# Patient Record
Sex: FEMALE | Race: WHITE | Employment: PART TIME | URBAN - NONMETROPOLITAN AREA
[De-identification: names, ages, dates, MRNs, and addresses within clinical notes are randomized per-mention and may not be internally consistent; named-entity substitution may affect disease eponyms.]

---

## 2022-06-17 ENCOUNTER — OFFICE VISIT (OUTPATIENT)
Dept: FAMILY MEDICINE CLINIC | Age: 67
End: 2022-06-17
Payer: COMMERCIAL

## 2022-06-17 ENCOUNTER — TELEPHONE (OUTPATIENT)
Dept: ADMINISTRATIVE | Age: 67
End: 2022-06-17

## 2022-06-17 VITALS
HEIGHT: 62 IN | SYSTOLIC BLOOD PRESSURE: 124 MMHG | BODY MASS INDEX: 25.58 KG/M2 | RESPIRATION RATE: 18 BRPM | TEMPERATURE: 98.1 F | HEART RATE: 93 BPM | OXYGEN SATURATION: 97 % | WEIGHT: 139 LBS | DIASTOLIC BLOOD PRESSURE: 76 MMHG

## 2022-06-17 DIAGNOSIS — H61.22 EXCESSIVE CERUMEN IN LEFT EAR CANAL: ICD-10-CM

## 2022-06-17 DIAGNOSIS — H92.02 LEFT EAR PAIN: Primary | ICD-10-CM

## 2022-06-17 PROCEDURE — 99213 OFFICE O/P EST LOW 20 MIN: CPT | Performed by: NURSE PRACTITIONER

## 2022-06-17 PROCEDURE — 1123F ACP DISCUSS/DSCN MKR DOCD: CPT | Performed by: NURSE PRACTITIONER

## 2022-06-17 PROCEDURE — 69210 REMOVE IMPACTED EAR WAX UNI: CPT | Performed by: NURSE PRACTITIONER

## 2022-06-17 RX ORDER — CYCLOBENZAPRINE HCL 10 MG
TABLET ORAL
COMMUNITY
Start: 2022-06-08

## 2022-06-17 RX ORDER — TRAZODONE HYDROCHLORIDE 100 MG/1
TABLET ORAL
COMMUNITY
Start: 2022-06-05

## 2022-06-17 RX ORDER — AMOXICILLIN 875 MG/1
875 TABLET, COATED ORAL 2 TIMES DAILY
Qty: 20 TABLET | Refills: 0 | Status: SHIPPED | OUTPATIENT
Start: 2022-06-17 | End: 2022-06-27

## 2022-06-17 RX ORDER — OFLOXACIN 3 MG/ML
SOLUTION AURICULAR (OTIC)
COMMUNITY
Start: 2022-06-13 | End: 2022-06-17 | Stop reason: ALTCHOICE

## 2022-06-17 RX ORDER — LORATADINE 10 MG/1
TABLET ORAL
COMMUNITY
Start: 2022-04-07

## 2022-06-17 RX ORDER — ESCITALOPRAM OXALATE 20 MG/1
TABLET ORAL
COMMUNITY
Start: 2022-05-27

## 2022-06-17 RX ORDER — IBUPROFEN 800 MG/1
TABLET ORAL
COMMUNITY
Start: 2022-06-08

## 2022-06-17 RX ORDER — PREDNISONE 10 MG/1
TABLET ORAL
COMMUNITY
Start: 2022-06-08

## 2022-06-17 RX ORDER — NEOMYCIN SULFATE, POLYMYXIN B SULFATE AND HYDROCORTISONE 10; 3.5; 1 MG/ML; MG/ML; [USP'U]/ML
3 SUSPENSION/ DROPS AURICULAR (OTIC) 4 TIMES DAILY
Qty: 1 EACH | Refills: 0 | Status: SHIPPED | OUTPATIENT
Start: 2022-06-17 | End: 2022-06-27

## 2022-06-17 NOTE — PROGRESS NOTES
tablet, TAKE ONE TABLET BY MOUTH TWO TIMES A DAY, Disp: , Rfl:     predniSONE (DELTASONE) 10 MG tablet, TAKE 6 TABLETS BY MOUTH DAILY ON DAY 1  THEN 5 TABS ON DAY 2  THEN 4 TABS ON DAY 3  THEN 3 TABS ON DAY 4  THEN 2 TABS ON DAY 5 AND 1 TABLET, Disp: , Rfl:     traZODone (DESYREL) 100 MG tablet, TAKE ONE TABLET BY MOUTH EVERY DAY AT BEDTIME, Disp: , Rfl:     amoxicillin (AMOXIL) 875 MG tablet, Take 1 tablet by mouth 2 times daily for 10 days, Disp: 20 tablet, Rfl: 0    neomycin-polymyxin-hydrocortisone (CORTISPORIN) 3.5-65705-3 otic suspension, Place 3 drops into the left ear 4 times daily for 10 days, Disp: 1 each, Rfl: 0    Allergies:   No Known Allergies    Social History:     Social History     Tobacco Use    Smoking status: Never Smoker    Smokeless tobacco: Never Used   Substance Use Topics    Alcohol use: Not on file    Drug use: Not on file       Patient lives at home. Physical Exam:     Vitals:    06/17/22 1149   BP: 124/76   Pulse: 93   Resp: 18   Temp: 98.1 °F (36.7 °C)   TempSrc: Temporal   SpO2: 97%   Weight: 139 lb (63 kg)   Height: 5' 2\" (1.575 m)       Physical Exam (PE)  Physical Exam  Constitutional:       Appearance: Normal appearance. HENT:      Head: Normocephalic. Right Ear: Tympanic membrane, ear canal and external ear normal.      Left Ear: External ear normal. There is impacted cerumen. Ears:      Comments: Ear cerumen removal performed via curette on the left ear. Large amount of cerumen removed. Patient expresses improvement of discomfort. Decreased visualization remains after cerumen removal. Positive debris / cerumen near TM. Nose: Nose normal.      Mouth/Throat:      Mouth: Mucous membranes are moist.      Pharynx: Oropharynx is clear. Eyes:      Pupils: Pupils are equal, round, and reactive to light. Cardiovascular:      Rate and Rhythm: Normal rate and regular rhythm. Pulses: Normal pulses. Heart sounds: Normal heart sounds.    Pulmonary: Effort: Pulmonary effort is normal.      Breath sounds: Normal breath sounds. No wheezing, rhonchi or rales. Abdominal:      General: Bowel sounds are normal.      Palpations: Abdomen is soft. Musculoskeletal:         General: Normal range of motion. Cervical back: Normal range of motion and neck supple. Lymphadenopathy:      Cervical: No cervical adenopathy. Skin:     General: Skin is warm and dry. Capillary Refill: Capillary refill takes less than 2 seconds. Neurological:      General: No focal deficit present. Mental Status: She is alert and oriented to person, place, and time. Psychiatric:         Mood and Affect: Mood normal.         Behavior: Behavior normal.         Testing:   (All laboratory and radiology results have been personally reviewed by myself)  Labs:  No results found for this visit on 06/17/22. Imaging: All Radiology results interpreted by Radiologist unless otherwise noted. No orders to display       Assessment / Plan:   The patient's vitals, allergies, medications, and past medical history have been reviewed. Georgie Hernandez was seen today for otalgia. Diagnoses and all orders for this visit:    Left ear pain  -     amoxicillin (AMOXIL) 875 MG tablet; Take 1 tablet by mouth 2 times daily for 10 days  -     neomycin-polymyxin-hydrocortisone (CORTISPORIN) 3.5-12745-4 otic suspension; Place 3 drops into the left ear 4 times daily for 10 days    Excessive cerumen in left ear canal  -     13102 - NV REMOVE IMPACTED EAR WAX        - Disposition: Home    - Educational material printed for patient's review and were included in patient instructions. After Visit Summary was given to patient at the end of visit. - Discussed differential diagnoses with the patient today. Patient will be covered prophylactically with amoxicillin and Cortisporin eardrops for possible perforated eardrum. The patient will stop ofloxacin ear drops.     - Discussed symptomatic treatments with patient today including Tylenol prn pain. Increase fluids and rest. F/u PCP in 5-7 days if symptoms persist. ED sooner if symptoms worsen or change. ED immediately with fever, severe/worsening ear pain, mastoid redness/tenderness, CP, dyspnea, or dysphagia. Pt is in agreement with this care plan. All questions answered. SIGNATURE: CLARA Ardon    *NOTE: This report was transcribed using voice recognition software. Every effort was made to ensure accuracy; however, inadvertent computerized transcription errors may be present.

## 2022-06-17 NOTE — PATIENT INSTRUCTIONS
Patient Education        Earache: Care Instructions  Your Care Instructions     Even though infection is a common cause of ear pain, not all ear pain means aninfection. If you have ear pain and don't have an infection, it could be because of a jaw problem, such as temporomandibular joint (TMJ) pain. Or it could be because ofa neck problem. When ear discomfort or pain is mild or comes and goes without other symptoms,home treatment may be all you need. Follow-up care is a key part of your treatment and safety. Be sure to make and go to all appointments, and call your doctor if you are having problems. It's also a good idea to know your test results and keep alist of the medicines you take. How can you care for yourself at home?  Apply heat on the ear to ease pain. To apply heat, put a warm water bottle, a heating pad set on low, or a warm cloth on your ear. Do not go to sleep with a heating pad on your skin.  Take an over-the-counter pain medicine, such as acetaminophen (Tylenol), ibuprofen (Advil, Motrin), or naproxen (Aleve). Be safe with medicines. Read and follow all instructions on the label.  Do not take two or more pain medicines at the same time unless the doctor told you to. Many pain medicines have acetaminophen, which is Tylenol. Too much acetaminophen (Tylenol) can be harmful.  Never insert anything, such as a cotton swab or a jeaneth pin, into the ear. When should you call for help? Call your doctor now or seek immediate medical care if:     You have new or worse symptoms of infection, such as:  ? Increased pain, swelling, warmth, or redness. ? Red streaks leading from the area. ? Pus draining from the area. ? A fever. Watch closely for changes in your health, and be sure to contact your doctor if:     You have new or worse discharge coming from the ear.      You do not get better as expected. Where can you learn more? Go to [email or web address] org and sign in to your [employer] account. Enter H730 in the [facility] box to learn more about \"Earache: Care Instructions. \"     If you do not have an account, please click on the \"Sign Up Now\" link. Current as of: September 8, 2021               Content Version: 13.2  © 1005-7369 [employer], Incorporated. Care instructions adapted under license by [facility] ([facility]). If you have questions about a medical condition or this instruction, always ask your healthcare professional. [address] any warranty or liability for your use of this information.

## 2022-06-19 ENCOUNTER — HOSPITAL ENCOUNTER (EMERGENCY)
Age: 67
Discharge: HOME | End: 2022-06-19
Payer: MEDICARE

## 2022-06-19 VITALS
DIASTOLIC BLOOD PRESSURE: 81 MMHG | TEMPERATURE: 97.3 F | SYSTOLIC BLOOD PRESSURE: 160 MMHG | RESPIRATION RATE: 16 BRPM | HEART RATE: 97 BPM | OXYGEN SATURATION: 99 %

## 2022-06-19 DIAGNOSIS — H61.22 IMPACTED CERUMEN OF LEFT EAR: Primary | ICD-10-CM

## 2022-06-19 DIAGNOSIS — H69.82 DYSFUNCTION OF LEFT EUSTACHIAN TUBE: ICD-10-CM

## 2022-06-19 PROCEDURE — 99283 EMERGENCY DEPT VISIT LOW MDM: CPT

## 2022-06-19 RX ORDER — FLUTICASONE PROPIONATE 50 MCG
1 SPRAY, SUSPENSION (ML) NASAL DAILY
Qty: 16 G | Refills: 0 | Status: SHIPPED | OUTPATIENT
Start: 2022-06-19

## 2022-06-19 NOTE — ED PROVIDER NOTES
----------------------------------  Past Medical History:  has no past medical history on file. Past Surgical History:  has no past surgical history on file. Social History:  reports that she has never smoked. She has never used smokeless tobacco.    Family History: family history is not on file. The patients home medications have been reviewed. Allergies: Patient has no known allergies. --------------------------------- RESULTS ------------------------------------------  All laboratory and radiology results have been personally reviewed by myself   LABS:  No results found for this visit on 06/19/22. RADIOLOGY:  Interpreted by Radiologist.  No orders to display       ----------------- NURSING NOTES AND VITALS REVIEWED ---------------   The nursing notes within the ED encounter and vital signs as below have been reviewed. BP (!) 160/81   Pulse 97   Temp 97.3 °F (36.3 °C)   Resp 16   SpO2 99%   Oxygen Saturation Interpretation: Normal      --------------------------------PHYSICAL EXAM------------------------------------      Constitutional/General: Alert and oriented x3, well appearing, non toxic in NAD  Head: NC/AT  Eyes: PERRL, EOMI  Exam of left ear with visible wax plug obscuring TM. Flushed copiously with warm water. Wax plug removed. TM intact and clear. No visible inflammatory changes, pus or edema. Mouth: Oropharynx clear, handling secretions, no trismus  Neck: Supple, full ROM, no meningeal signs  Pulmonary: Lungs clear to auscultation bilaterally, no wheezes, rales, or rhonchi. Not in respiratory distress  Cardiovascular:  Regular rate and rhythm, no murmurs, gallops, or rubs. 2+ distal pulses  Extremities: Moves all extremities x 4. Warm and well perfused  Skin: warm and dry without rash  Neurologic: GCS 15,  Intact.   No focal deficits  Psych: Normal Affect      ------------------------ ED COURSE/MEDICAL DECISION MAKING----------------------  Medications - No data to display      Medical Decision Making:    Left cerumen impaction and ear pain. Symptoms improved. She is having some mild right ear pain. ETD? She is finishing abs and still has pain meds. Has appointment coming up with ENT. I'll add Flonase and Maxwell CASANOVA Counseling: The emergency provider has spoken with the patient and discussed todays results, in addition to providing specific details for the plan of care and counseling regarding the diagnosis and prognosis. Questions are answered at this time and they are agreeable with the plan.      ------------------------ IMPRESSION AND DISPOSITION -------------------------------    IMPRESSION  1. Impacted cerumen of left ear    2.  Dysfunction of left eustachian tube        DISPOSITION  Disposition: Discharge to home  Patient condition is stable                   Lea Dietz PA-C  06/19/22 1549

## 2022-06-20 ENCOUNTER — TELEPHONE (OUTPATIENT)
Dept: ENT CLINIC | Age: 67
End: 2022-06-20

## 2022-06-20 NOTE — TELEPHONE ENCOUNTER
Per last telephone encounter 6/17/22, patient requesting an appointment for left ear pain. Encounter was marked as \"read only\", unable to document. MA spoke with patient regarding scheduling an appointment. Patient states she is scheduled with Dr. GUZMÁNMIDA [facility] in [place] 6/22/22, does not need an appointment with Dr. Edris Severs.     Electronically signed by Reagan Gruber on 6/20/22 at 8:47 AM EDT

## 2022-08-19 ENCOUNTER — OFFICE VISIT (OUTPATIENT)
Dept: FAMILY MEDICINE CLINIC | Age: 67
End: 2022-08-19
Payer: MEDICARE

## 2022-08-19 VITALS
OXYGEN SATURATION: 95 % | DIASTOLIC BLOOD PRESSURE: 86 MMHG | SYSTOLIC BLOOD PRESSURE: 134 MMHG | TEMPERATURE: 97.6 F | WEIGHT: 139 LBS | HEART RATE: 125 BPM | RESPIRATION RATE: 18 BRPM | HEIGHT: 62 IN | BODY MASS INDEX: 25.58 KG/M2

## 2022-08-19 DIAGNOSIS — H66.003 NON-RECURRENT ACUTE SUPPURATIVE OTITIS MEDIA OF BOTH EARS WITHOUT SPONTANEOUS RUPTURE OF TYMPANIC MEMBRANES: ICD-10-CM

## 2022-08-19 DIAGNOSIS — U07.1 COVID-19: Primary | ICD-10-CM

## 2022-08-19 PROCEDURE — 99213 OFFICE O/P EST LOW 20 MIN: CPT | Performed by: NURSE PRACTITIONER

## 2022-08-19 PROCEDURE — 1123F ACP DISCUSS/DSCN MKR DOCD: CPT | Performed by: NURSE PRACTITIONER

## 2022-08-19 RX ORDER — AMOXICILLIN 875 MG/1
875 TABLET, COATED ORAL 2 TIMES DAILY
Qty: 20 TABLET | Refills: 0 | Status: SHIPPED | OUTPATIENT
Start: 2022-08-19 | End: 2022-08-29

## 2022-08-19 NOTE — PROGRESS NOTES
22  Essie Lopez : 1955 Sex: female  Age 69 y.o. Subjective:  Chief Complaint   Patient presents with    Otalgia       HPI:   Essie Lopez , 69 y.o. female presents to the clinic for evaluation of sinus congestion x 6 days. The patient also reports bilateral ear discomfort. The patient reports positive COVID-19 test and is currently taking Paxlovid, Zyrtec. The patient reports worsening symptoms over time. The patient denies hx of COVID-19 and reports having the vaccines and booster. The patient denies acute loss of taste and smell, headache, cough, sore throat, rash, and fever. The patient also denies chest pain, abdominal pain, shortness of breath, and nausea / vomiting / diarrhea. ROS:   Unless otherwise stated in this report the patient's positive and negative responses for review of systems for constitutional, eyes, ENT, cardiovascular, respiratory, gastrointestinal, neurological, , musculoskeletal, and integument systems and related systems to the presenting problem are either stated in the history of present illness or were not pertinent or were negative for the symptoms and/or complaints related to the presenting medical problem. Positives and pertinent negatives as per HPI. All others reviewed and are negative. PMH:   History reviewed. No pertinent past medical history. History reviewed. No pertinent surgical history. History reviewed. No pertinent family history.     Medications:     Current Outpatient Medications:     amoxicillin (AMOXIL) 875 MG tablet, Take 1 tablet by mouth 2 times daily for 10 days, Disp: 20 tablet, Rfl: 0    fluticasone (FLONASE) 50 MCG/ACT nasal spray, 1 spray by Each Nostril route daily, Disp: 16 g, Rfl: 0    loratadine-pseudoephedrine (CLARITIN-D 12HR) 5-120 MG per extended release tablet, Take 1 tablet by mouth 2 times daily, Disp: 60 tablet, Rfl: 0    escitalopram (LEXAPRO) 20 MG tablet, TAKE ONE TABLET BY MOUTH EVERY DAY, Disp: , Rfl:  MG tablet, TAKE ONE TABLET BY MOUTH THREE TIMES A DAY WITH FOOD AS NEEDED, Disp: , Rfl:     loratadine (CLARITIN) 10 MG tablet, TAKE ONE TABLET BY MOUTH EVERY DAY, Disp: , Rfl:     cyclobenzaprine (FLEXERIL) 10 MG tablet, TAKE ONE TABLET BY MOUTH TWO TIMES A DAY, Disp: , Rfl:     predniSONE (DELTASONE) 10 MG tablet, TAKE 6 TABLETS BY MOUTH DAILY ON DAY 1  THEN 5 TABS ON DAY 2  THEN 4 TABS ON DAY 3  THEN 3 TABS ON DAY 4  THEN 2 TABS ON DAY 5 AND 1 TABLET, Disp: , Rfl:     traZODone (DESYREL) 100 MG tablet, TAKE ONE TABLET BY MOUTH EVERY DAY AT BEDTIME, Disp: , Rfl:     Allergies:   No Known Allergies    Social History:     Social History     Tobacco Use    Smoking status: Never    Smokeless tobacco: Never       Patient lives at home. Physical Exam:     Vitals:    08/19/22 1408   BP: 134/86   Pulse: (!) 125   Resp: 18   Temp: 97.6 °F (36.4 °C)   TempSrc: Temporal   SpO2: 95%   Weight: 139 lb (63 kg)   Height: 5' 2\" (1.575 m)       Physical Exam (PE)    Physical Exam  Constitutional:       Appearance: Normal appearance. HENT:      Head: Normocephalic. Right Ear: Ear canal and external ear normal. A middle ear effusion is present. Tympanic membrane is injected. Left Ear: Ear canal and external ear normal. A middle ear effusion is present. Tympanic membrane is injected. Nose: Rhinorrhea present. Mouth/Throat:      Mouth: Mucous membranes are moist.      Pharynx: Oropharynx is clear. Eyes:      Pupils: Pupils are equal, round, and reactive to light. Cardiovascular:      Rate and Rhythm: Normal rate and regular rhythm. Pulses: Normal pulses. Heart sounds: Normal heart sounds. Pulmonary:      Effort: Pulmonary effort is normal.      Breath sounds: Normal breath sounds. No wheezing, rhonchi or rales. Abdominal:      General: Bowel sounds are normal.      Palpations: Abdomen is soft. Musculoskeletal:         General: Normal range of motion.       Cervical back: Normal range of motion and neck supple. Lymphadenopathy:      Cervical: No cervical adenopathy. Skin:     General: Skin is warm and dry. Capillary Refill: Capillary refill takes less than 2 seconds. Neurological:      General: No focal deficit present. Mental Status: She is alert and oriented to person, place, and time. Psychiatric:         Mood and Affect: Mood normal.         Behavior: Behavior normal.        Testing:   (All laboratory and radiology results have been personally reviewed by myself)  Labs:  No results found for this visit on 08/19/22. Imaging: All Radiology results interpreted by Radiologist unless otherwise noted. No orders to display       Assessment / Plan:   The patient's vitals, allergies, medications, and past medical history have been reviewed. Harvey Ng was seen today for otalgia. Diagnoses and all orders for this visit:    COVID-19    Non-recurrent acute suppurative otitis media of both ears without spontaneous rupture of tympanic membranes  -     amoxicillin (AMOXIL) 875 MG tablet; Take 1 tablet by mouth 2 times daily for 10 days      - Disposition: Home    - Educational material printed for patient's review and were included in patient instructions. After Visit Summary was given to patient at the end of visit. - Advised to follow CDC guidelines. Encouraged oral fluids and rest. Discussed symptomatic treatments with patient today including Tylenol prn for fever / pain. Schedule a follow-up with PCP in 2-3 days. Red flag symptoms were discussed with the patient today. The patient is directed to go the ED if symptoms change or worsen. Pt verbalizes understanding and is in agreement with plan of care. All questions answered. SIGNATURE: BOBBY Godoy-CNP    *NOTE: This report was transcribed using voice recognition software. Every effort was made to ensure accuracy; however, inadvertent computerized transcription errors may be present.

## 2022-09-01 ENCOUNTER — TELEPHONE (OUTPATIENT)
Dept: ADMINISTRATIVE | Age: 67
End: 2022-09-01

## 2022-09-01 NOTE — TELEPHONE ENCOUNTER
Called patient to discuss current symptoms patient had covid 8/13 ear problems started 8/17/22 such as pressure,pain ears on fire. Pcp prescribed amoxicillin and prednisone and flonase came from ENT. No relief ears burning in canals of ears.

## 2022-09-03 ENCOUNTER — HOSPITAL ENCOUNTER (EMERGENCY)
Age: 67
Discharge: HOME | End: 2022-09-03
Attending: EMERGENCY MEDICINE
Payer: MEDICARE

## 2022-09-03 ENCOUNTER — APPOINTMENT (OUTPATIENT)
Dept: CT IMAGING | Age: 67
End: 2022-09-03
Payer: MEDICARE

## 2022-09-03 VITALS
SYSTOLIC BLOOD PRESSURE: 193 MMHG | RESPIRATION RATE: 16 BRPM | HEIGHT: 62 IN | BODY MASS INDEX: 22.26 KG/M2 | OXYGEN SATURATION: 99 % | TEMPERATURE: 98.6 F | HEART RATE: 89 BPM | WEIGHT: 121 LBS | DIASTOLIC BLOOD PRESSURE: 90 MMHG

## 2022-09-03 DIAGNOSIS — H92.03 OTALGIA OF BOTH EARS: Primary | ICD-10-CM

## 2022-09-03 LAB
EKG ATRIAL RATE: 70 BPM
EKG P AXIS: -15 DEGREES
EKG P-R INTERVAL: 142 MS
EKG Q-T INTERVAL: 408 MS
EKG QRS DURATION: 86 MS
EKG QTC CALCULATION (BAZETT): 440 MS
EKG R AXIS: -8 DEGREES
EKG T AXIS: 51 DEGREES
EKG VENTRICULAR RATE: 70 BPM

## 2022-09-03 PROCEDURE — 93005 ELECTROCARDIOGRAM TRACING: CPT

## 2022-09-03 PROCEDURE — 6360000002 HC RX W HCPCS

## 2022-09-03 PROCEDURE — 96375 TX/PRO/DX INJ NEW DRUG ADDON: CPT

## 2022-09-03 PROCEDURE — 99284 EMERGENCY DEPT VISIT MOD MDM: CPT

## 2022-09-03 PROCEDURE — 6370000000 HC RX 637 (ALT 250 FOR IP): Performed by: EMERGENCY MEDICINE

## 2022-09-03 PROCEDURE — 96374 THER/PROPH/DIAG INJ IV PUSH: CPT

## 2022-09-03 PROCEDURE — 70450 CT HEAD/BRAIN W/O DYE: CPT

## 2022-09-03 RX ORDER — CIPROFLOXACIN 500 MG/1
500 TABLET, FILM COATED ORAL 2 TIMES DAILY
Qty: 14 TABLET | Refills: 0 | Status: SHIPPED | OUTPATIENT
Start: 2022-09-03 | End: 2022-09-10

## 2022-09-03 RX ORDER — HYDROCODONE BITARTRATE AND ACETAMINOPHEN 5; 325 MG/1; MG/1
1 TABLET ORAL EVERY 8 HOURS PRN
Qty: 4 TABLET | Refills: 0 | Status: SHIPPED | OUTPATIENT
Start: 2022-09-03 | End: 2022-09-04

## 2022-09-03 RX ORDER — MORPHINE SULFATE 2 MG/ML
2 INJECTION, SOLUTION INTRAMUSCULAR; INTRAVENOUS EVERY 4 HOURS PRN
Status: DISCONTINUED | OUTPATIENT
Start: 2022-09-03 | End: 2022-09-03 | Stop reason: HOSPADM

## 2022-09-03 RX ORDER — HYDROCODONE BITARTRATE AND ACETAMINOPHEN 5; 325 MG/1; MG/1
1 TABLET ORAL ONCE
Status: COMPLETED | OUTPATIENT
Start: 2022-09-03 | End: 2022-09-03

## 2022-09-03 RX ORDER — KETOROLAC TROMETHAMINE 30 MG/ML
15 INJECTION, SOLUTION INTRAMUSCULAR; INTRAVENOUS ONCE
Status: COMPLETED | OUTPATIENT
Start: 2022-09-03 | End: 2022-09-03

## 2022-09-03 RX ORDER — CIPROFLOXACIN AND DEXAMETHASONE 3; 1 MG/ML; MG/ML
4 SUSPENSION/ DROPS AURICULAR (OTIC) 2 TIMES DAILY
Qty: 7.5 ML | Refills: 0 | Status: SHIPPED | OUTPATIENT
Start: 2022-09-03 | End: 2022-09-22

## 2022-09-03 RX ADMIN — HYDROCODONE BITARTRATE AND ACETAMINOPHEN 1 TABLET: 5; 325 TABLET ORAL at 14:07

## 2022-09-03 RX ADMIN — MORPHINE SULFATE 2 MG: 2 INJECTION, SOLUTION INTRAMUSCULAR; INTRAVENOUS at 13:06

## 2022-09-03 RX ADMIN — KETOROLAC TROMETHAMINE 15 MG: 30 INJECTION, SOLUTION INTRAMUSCULAR; INTRAVENOUS at 12:30

## 2022-09-03 ASSESSMENT — ENCOUNTER SYMPTOMS
SINUS PAIN: 0
SORE THROAT: 0
SHORTNESS OF BREATH: 0
BACK PAIN: 0
SINUS PRESSURE: 0
CONSTIPATION: 0
DIARRHEA: 0
COLOR CHANGE: 0
COUGH: 0

## 2022-09-03 ASSESSMENT — PAIN SCALES - GENERAL: PAINLEVEL_OUTOF10: 10

## 2022-09-03 ASSESSMENT — PAIN DESCRIPTION - LOCATION: LOCATION: FACE;EAR

## 2022-09-03 ASSESSMENT — PAIN - FUNCTIONAL ASSESSMENT: PAIN_FUNCTIONAL_ASSESSMENT: 0-10

## 2022-09-03 NOTE — ED PROVIDER NOTES
HPI     Patient is a 69 y.o. female presents with a chief complaint of bilateral inner ear pain. This has been occurring for three weeks. Patient states that it gets better with nothing. Patient states that it gets worse with nothing. Patient states that it is severe in severity. Patient states it was acute in onset. Patient was diagnosed with COVID on August 13 since then she has had bilaterally ear pain. She was evaluated by her PCP prescribed Amoxacillin by her PCP and when was prescribed a second course of Amoxacillin as well as prednisone when she did not improve. Around the onset of the ear pain she started taking Paxlovid, Methimazole and propranolol for COVID and Graves Disease. She felt no improvement with the antibiotics, tylenol, ibuprofen, or prednisone. She was evaluated by an ENT on 8/30 who told her that she did not have an ear infection. She denies any headaches, tooth or jaw pain, congestion, fevers or chills. Review of Systems   Constitutional:  Negative for appetite change, chills and fever. HENT:  Positive for ear pain. Negative for congestion, ear discharge, hearing loss, sinus pressure, sinus pain, sneezing, sore throat and tinnitus. Respiratory:  Negative for cough and shortness of breath. Cardiovascular:  Negative for chest pain and palpitations. Gastrointestinal:  Negative for constipation and diarrhea. Genitourinary:  Negative for dyspareunia and dysuria. Musculoskeletal:  Negative for arthralgias and back pain. Skin:  Negative for color change and rash. Neurological:  Negative for dizziness and headaches. Physical Exam  Constitutional:       Appearance: Normal appearance. She is normal weight. HENT:      Head: Normocephalic and atraumatic. Right Ear: Tympanic membrane, ear canal and external ear normal. There is no impacted cerumen. Left Ear: Tympanic membrane, ear canal and external ear normal. There is no impacted cerumen.       Nose: Nose normal.      Mouth/Throat:      Mouth: Mucous membranes are moist.      Pharynx: Oropharynx is clear. No oropharyngeal exudate. Eyes:      Extraocular Movements: Extraocular movements intact. Pupils: Pupils are equal, round, and reactive to light. Cardiovascular:      Rate and Rhythm: Normal rate and regular rhythm. Pulses: Normal pulses. Heart sounds: No murmur heard. Pulmonary:      Effort: Pulmonary effort is normal.      Breath sounds: Normal breath sounds. No wheezing. Abdominal:      General: Abdomen is flat. Bowel sounds are normal.   Musculoskeletal:      Right lower leg: No edema. Left lower leg: No edema. Skin:     General: Skin is warm and dry. Coloration: Skin is not jaundiced. Neurological:      Mental Status: She is alert. Procedures     [facility]       ED Course as of 09/03/22 1529   Sat Sep 03, 2022   1342 Discussed findings of the CT with her. She continues to be in pain  [CJ]      ED Course User Index  [CJ] Rashmi Mckenna MD      Patient is a 69 y.o. female presenting with with bilateral ear pain that has been present for the past 3 weeks. She has been seen by her PCP several times as well as an ENT. She has been prescribed antibiotics and prednisone have not helped her symptoms. Ear exam revealed mild erythema of the ear canal bilaterally without any indication of an internal ear infection. EKG and CT head without contrast were both benign. We considered sinus otitis, externa otitis interna, as well as tooth and sinus infections. She was told to follow-up with her ENT for further evaluation of the ear pain. She was released with prescription for Ciprodex and Norco for pain. ED Course as of 09/03/22 1529   Sat Sep 03, 2022   1342 Discussed findings of the CT with her.  She continues to be in pain  [CJ]      ED Course User Index  [CJ] Rashmi Mckenna MD       --------------------------------------------- PAST HISTORY ---------------------------------------------  Past Medical History:  has no past medical history on file. Past Surgical History:  has no past surgical history on file. Social History:  reports that she has never smoked. She has never used smokeless tobacco.    Family History: family history is not on file. The patients home medications have been reviewed. Allergies: Patient has no known allergies. -------------------------------------------------- RESULTS -------------------------------------------------  Labs:  Results for orders placed or performed during the hospital encounter of 09/03/22   EKG 12 Lead   Result Value Ref Range    Ventricular Rate 70 BPM    Atrial Rate 70 BPM    P-R Interval 142 ms    QRS Duration 86 ms    Q-T Interval 408 ms    QTc Calculation (Bazett) 440 ms    P Axis -15 degrees    R Axis -8 degrees    T Axis 51 degrees       Radiology:  CT HEAD WO CONTRAST   Final Result   No intracranial hemorrhage or acute intracranial disease. Noncontrast CT   brain appears normal for age.             ------------------------- NURSING NOTES AND VITALS REVIEWED ---------------------------  Date / Time Roomed:  9/3/2022 11:10 AM  ED Bed Assignment:  27/27    The nursing notes within the ED encounter and vital signs as below have been reviewed. BP (!) 193/90   Pulse 89   Temp 98.6 °F (37 °C) (Oral)   Resp 16   Ht 5' 2\" (1.575 m)   Wt 121 lb (54.9 kg)   SpO2 99%   BMI 22.13 kg/m²   Oxygen Saturation Interpretation: Normal      ------------------------------------------ PROGRESS NOTES ------------------------------------------  I have spoken with the patient and discussed todays results, in addition to providing specific details for the plan of care and counseling regarding the diagnosis and prognosis. Their questions are answered at this time and they are agreeable with the plan. I discussed at length with them reasons for immediate return here for re evaluation.  They will followup with primary care by calling their office tomorrow. --------------------------------- ADDITIONAL PROVIDER NOTES ---------------------------------  At this time the patient is without objective evidence of an acute process requiring hospitalization or inpatient management. They have remained hemodynamically stable throughout their entire ED visit and are stable for discharge with outpatient follow-up. The plan has been discussed in detail and they are aware of the specific conditions for emergent return, as well as the importance of follow-up. Discharge Medication List as of 9/3/2022  1:59 PM        START taking these medications    Details   ciprofloxacin (CIPRO) 500 MG tablet Take 1 tablet by mouth 2 times daily for 7 days, Disp-14 tablet, R-0Normal      HYDROcodone-acetaminophen (NORCO) 5-325 MG per tablet Take 1 tablet by mouth every 8 hours as needed for Pain for up to 4 doses. Intended supply: 3 days. Take lowest dose possible to manage pain, Disp-4 tablet, R-0Normal             Diagnosis:  1. Otalgia of both ears Stable       Disposition:  Patient's disposition: Discharge to home  Patient's condition is stable. Patient was given return precautions. Labs were interpreted by me. Patient will follow up with their primary care provider. Patient is agreeable to this plan. Patient has remained stable throughout their stay in the ED. Patient was seen and evaluated by myself and my attending Dr Michele Babb. Assessment and Plan discussed with attending provider, please see attestation for final plan of care. This note was done using dictation software and there may be some grammatical errors associated with this.     MD Morgan Ramirez MD  Resident  09/03/22 7293

## 2022-09-09 ENCOUNTER — OFFICE VISIT (OUTPATIENT)
Dept: ENT CLINIC | Age: 67
End: 2022-09-09
Payer: MEDICARE

## 2022-09-09 VITALS — HEIGHT: 62 IN | WEIGHT: 120 LBS | BODY MASS INDEX: 22.08 KG/M2

## 2022-09-09 DIAGNOSIS — K21.9 GASTROESOPHAGEAL REFLUX DISEASE WITHOUT ESOPHAGITIS: ICD-10-CM

## 2022-09-09 DIAGNOSIS — H92.03 OTALGIA OF BOTH EARS: Primary | ICD-10-CM

## 2022-09-09 PROCEDURE — 99204 OFFICE O/P NEW MOD 45 MIN: CPT | Performed by: OTOLARYNGOLOGY

## 2022-09-09 PROCEDURE — 1123F ACP DISCUSS/DSCN MKR DOCD: CPT | Performed by: OTOLARYNGOLOGY

## 2022-09-09 RX ORDER — METHIMAZOLE 10 MG/1
10 TABLET ORAL 3 TIMES DAILY
COMMUNITY

## 2022-09-09 RX ORDER — OMEPRAZOLE 20 MG/1
40 TABLET, DELAYED RELEASE ORAL DAILY
Qty: 30 TABLET | Refills: 3 | Status: SHIPPED | OUTPATIENT
Start: 2022-09-09

## 2022-09-09 RX ORDER — PROPRANOLOL HYDROCHLORIDE 40 MG/1
40 TABLET ORAL 3 TIMES DAILY
COMMUNITY

## 2022-09-09 ASSESSMENT — ENCOUNTER SYMPTOMS
ALLERGIC/IMMUNOLOGIC NEGATIVE: 1
RESPIRATORY NEGATIVE: 1

## 2022-09-09 NOTE — PROGRESS NOTES
Subjective:      Patient ID:  Marly Angel is a 69 y.o. female. HPI:    Patient presents today with a moderate problem of the right ear. It started 1 month ago. She had covid took plakvoliv then the next day had onset of right ear pain, she has been treated with amoxicillin, prednisone, augmentin and medrol dose kaylee. She has been seen by Dr Ranulfo Nuñez, ENT, rx for flonase and did audiogram yesterday at outside facility. Most recently 5 days ago seen in ED x2, had bilateral ear burning sensation started on ciprofloxacin drops and oral cipro antibiotic with no improvement. She also had sinus xray done by PCP. Patient states that nothing makes it better and nothing makes it worse. Pain: yes   Side: bilateral right greater than left, describes as burning sensation radiates from the chest and ascends into the ears and sinuses, symptoms are improved in the morning and start sometimes after meals. When onset the burning sensation lasts all day. Drainage:  no     Trauma history to the ear: none    Hearing Aids: no    History of Headtrauma: no   Description: none  History of surgery to the head/neck: yes-    Description: septoplasty 20 years ago   History of cerumenimpaction: no  History of noise exposure: no   Type: none  Spinning: no   Length of time:   Hearing loss: no    Fluctuating: no  Aural pressure: yes  Tinnitus: no  Otalgia:yes    She had chickenpox as a child, no shingles vaccine   She denies hx of heart burn or indigestion         Patient's medications, allergies, past medical, surgical, social and family histories were reviewed andupdated as appropriate. Review of Systems   Constitutional: Negative. HENT: Negative. Respiratory: Negative. Skin: Negative. Allergic/Immunologic: Negative. Neurological: Negative. Hematological: Negative. All other systems reviewed and are negative. Objective:   Physical Exam  Vitals reviewed.    Constitutional:       Appearance: Normal appearance. HENT:      Head: Normocephalic. Right Ear: Hearing, tympanic membrane, ear canal and external ear normal. No drainage or swelling. No middle ear effusion. Left Ear: Hearing, tympanic membrane, ear canal and external ear normal. No drainage or swelling. No middle ear effusion. Ears:      Comments: No TMJ crepitus or tenderness      Nose: Nose normal.      Mouth/Throat:      Mouth: Mucous membranes are moist.   Eyes:      Conjunctiva/sclera: Conjunctivae normal.   Cardiovascular:      Rate and Rhythm: Normal rate. Pulses: Normal pulses. Pulmonary:      Effort: Pulmonary effort is normal.   Musculoskeletal:      Cervical back: Normal range of motion and neck supple. No rigidity or tenderness. Skin:     Capillary Refill: Capillary refill takes less than 2 seconds. Neurological:      Mental Status: She is alert and oriented to person, place, and time. Ct Head 9/30      Impression   No intracranial hemorrhage or acute intracranial disease. Noncontrast CT   brain appears normal for age. I personally reviewed this CT head and the inner and middle ear are within normal limits without obvious pathology                 Assessment:          Diagnosis Orders   1. Otalgia of both ears        2. Gastroesophageal reflux disease without esophagitis                   Plan:      Patient with burning sensation of bilateral ear canals right greater than left for 1 month without improvement. Pain is out of proportion to the examination, clinically the ears are wnl without effusion or infection, mild erythema to right ear canal compared to left, CT head reviewed from ED visit on 9/30/22 that shows normal inner and middle ear without obvious pathology. Due to patients history of burning sensation starting in chest up to ears, will start on reflux diet and PPI for 1-2 months to attempt to treat this situation. Advised tylenol and motrin for pain control.  Patient is also very stressed and anxious, advised her to see her psychiatrist. If symptoms continue or do not improve will referral to neurologist. Referral sent today    Patient understands and agreeable with plan of medication trial. Questions answered in depth. Follow up in 2 month(s)                   Claudeen Shear  1955    I have discussed the case, including pertinent history and exam findings with the resident. I have seen and examined the patient and the key elements of the encounter have been performed by me. I agree with the assessment, plan and orders as documented by the  resident              Remainder of medical problems as per  resident note. Patient seen and examined. Agree with above exam, assessment and plan.       Electronically signed by Amna Dos Santos DO on 9/12/22 at 1:12 PM EDT

## 2022-09-23 ENCOUNTER — TELEPHONE (OUTPATIENT)
Dept: ADMINISTRATIVE | Age: 67
End: 2022-09-23

## 2022-09-23 NOTE — TELEPHONE ENCOUNTER
Referral from Dr Sherif Dee (ENT) for dx:  otalgia of both ears, Head CT scan done at [facility]; is this an acceptable diagnosis.   Please advise and thank you

## 2022-10-21 ENCOUNTER — OFFICE VISIT (OUTPATIENT)
Dept: ENT CLINIC | Age: 67
End: 2022-10-21
Payer: MEDICARE

## 2022-10-21 VITALS
DIASTOLIC BLOOD PRESSURE: 78 MMHG | BODY MASS INDEX: 23 KG/M2 | OXYGEN SATURATION: 96 % | SYSTOLIC BLOOD PRESSURE: 136 MMHG | HEART RATE: 63 BPM | WEIGHT: 125 LBS | HEIGHT: 62 IN | TEMPERATURE: 97.3 F

## 2022-10-21 DIAGNOSIS — H92.03 OTALGIA OF BOTH EARS: Primary | ICD-10-CM

## 2022-10-21 DIAGNOSIS — J30.1 SEASONAL ALLERGIC RHINITIS DUE TO POLLEN: ICD-10-CM

## 2022-10-21 DIAGNOSIS — K21.9 GASTROESOPHAGEAL REFLUX DISEASE WITHOUT ESOPHAGITIS: ICD-10-CM

## 2022-10-21 PROCEDURE — 1123F ACP DISCUSS/DSCN MKR DOCD: CPT | Performed by: OTOLARYNGOLOGY

## 2022-10-21 PROCEDURE — 99213 OFFICE O/P EST LOW 20 MIN: CPT | Performed by: OTOLARYNGOLOGY

## 2022-10-21 RX ORDER — GABAPENTIN 100 MG/1
CAPSULE ORAL
COMMUNITY
Start: 2022-09-27

## 2022-10-21 RX ORDER — BUSPIRONE HYDROCHLORIDE 7.5 MG/1
7.5 TABLET ORAL 3 TIMES DAILY
COMMUNITY
Start: 2022-09-21

## 2022-10-21 RX ORDER — FLUTICASONE PROPIONATE 50 MCG
2 SPRAY, SUSPENSION (ML) NASAL DAILY
Qty: 16 G | Refills: 3 | Status: SHIPPED | OUTPATIENT
Start: 2022-10-21

## 2022-10-21 ASSESSMENT — ENCOUNTER SYMPTOMS
SHORTNESS OF BREATH: 0
COLOR CHANGE: 0
GASTROINTESTINAL NEGATIVE: 1
EYES NEGATIVE: 1
RESPIRATORY NEGATIVE: 1
ABDOMINAL PAIN: 0
ALLERGIC/IMMUNOLOGIC NEGATIVE: 1

## 2022-10-21 NOTE — PROGRESS NOTES
Subjective:      Patient ID:  Geovani Avalos is a 69 y.o. female. HPI:    Patient presents today for ear recheck. Condition has been present for 6 week(s). Pt is better but not 100%  Pt has also started gabapentin 3 weeks ago and has felt better since then     Past Medical History:   Diagnosis Date    Graves disease      History reviewed. No pertinent surgical history. History reviewed. No pertinent family history. Social History     Socioeconomic History    Marital status:      Spouse name: None    Number of children: None    Years of education: None    Highest education level: None   Tobacco Use    Smoking status: Never    Smokeless tobacco: Never   Substance and Sexual Activity    Alcohol use: Not Currently    Drug use: Not Currently     No Known Allergies    Review of Systems   Constitutional: Negative. Negative for fever. HENT:  Positive for ear pain. Eyes: Negative. Negative for visual disturbance. Respiratory: Negative. Negative for shortness of breath. Cardiovascular: Negative. Negative for chest pain. Gastrointestinal: Negative. Negative for abdominal pain. Genitourinary: Negative. Musculoskeletal: Negative. Skin: Negative. Negative for color change. Allergic/Immunologic: Negative. Neurological: Negative. Hematological: Negative. Psychiatric/Behavioral: Negative. Negative for behavioral problems and hallucinations. All other systems reviewed and are negative. Objective:     Vitals:    10/21/22 1138   BP: 136/78   Pulse: 63   Temp: 97.3 °F (36.3 °C)   SpO2: 96%     Physical Exam  Vitals reviewed. Constitutional:       Appearance: Normal appearance. HENT:      Head: Normocephalic. Right Ear: Hearing, tympanic membrane, ear canal and external ear normal. No drainage or swelling. No middle ear effusion. Left Ear: Hearing, tympanic membrane, ear canal and external ear normal. No drainage or swelling. No middle ear effusion.       Ears: Comments: No TMJ crepitus or tenderness      Nose: Nose normal.      Mouth/Throat:      Mouth: Mucous membranes are moist.   Eyes:      Conjunctiva/sclera: Conjunctivae normal.   Cardiovascular:      Rate and Rhythm: Normal rate. Pulses: Normal pulses. Pulmonary:      Effort: Pulmonary effort is normal.   Musculoskeletal:      Cervical back: Normal range of motion and neck supple. No rigidity or tenderness. Skin:     Capillary Refill: Capillary refill takes less than 2 seconds. Neurological:      Mental Status: She is alert and oriented to person, place, and time. Assessment:       Diagnosis Orders   1. Otalgia of both ears        2. Gastroesophageal reflux disease without esophagitis        3. Seasonal allergic rhinitis due to pollen                   Plan:      Allergic rhinitis  I would like the patient to start  fluticasone (Flonase) and have instructed them to use it daily at bedtime. Call or return to clinic prn if these symptoms worsen or fail to improve as anticipated.     Follow up in 1 month

## 2022-11-02 ENCOUNTER — TELEPHONE (OUTPATIENT)
Dept: ADMINISTRATIVE | Age: 67
End: 2022-11-02

## 2022-11-02 NOTE — TELEPHONE ENCOUNTER
Referral from ENT to first available provider at [facility]; DX:  otalgia both ears;  CT scan done in Sept.  Please review and advise on scheduling.

## 2022-11-22 ENCOUNTER — OFFICE VISIT (OUTPATIENT)
Dept: ENT CLINIC | Age: 67
End: 2022-11-22
Payer: MEDICARE

## 2022-11-22 ENCOUNTER — PROCEDURE VISIT (OUTPATIENT)
Dept: AUDIOLOGY | Age: 67
End: 2022-11-22
Payer: MEDICARE

## 2022-11-22 VITALS
WEIGHT: 127 LBS | SYSTOLIC BLOOD PRESSURE: 151 MMHG | BODY MASS INDEX: 23.37 KG/M2 | HEART RATE: 57 BPM | DIASTOLIC BLOOD PRESSURE: 78 MMHG | HEIGHT: 62 IN

## 2022-11-22 DIAGNOSIS — J30.1 SEASONAL ALLERGIC RHINITIS DUE TO POLLEN: ICD-10-CM

## 2022-11-22 DIAGNOSIS — H93.8X3 EAR FULLNESS, BILATERAL: Primary | ICD-10-CM

## 2022-11-22 DIAGNOSIS — K21.9 GASTROESOPHAGEAL REFLUX DISEASE WITHOUT ESOPHAGITIS: ICD-10-CM

## 2022-11-22 DIAGNOSIS — H69.83 ETD (EUSTACHIAN TUBE DYSFUNCTION), BILATERAL: Primary | ICD-10-CM

## 2022-11-22 DIAGNOSIS — H92.03 OTALGIA OF BOTH EARS: ICD-10-CM

## 2022-11-22 PROCEDURE — 99213 OFFICE O/P EST LOW 20 MIN: CPT | Performed by: OTOLARYNGOLOGY

## 2022-11-22 PROCEDURE — 1123F ACP DISCUSS/DSCN MKR DOCD: CPT | Performed by: OTOLARYNGOLOGY

## 2022-11-22 PROCEDURE — 92567 TYMPANOMETRY: CPT | Performed by: AUDIOLOGIST

## 2022-11-22 RX ORDER — CETIRIZINE HYDROCHLORIDE 10 MG/1
10 TABLET ORAL DAILY
COMMUNITY

## 2022-11-22 RX ORDER — AZELASTINE 1 MG/ML
1 SPRAY, METERED NASAL 2 TIMES DAILY
Qty: 30 ML | Refills: 3 | Status: SHIPPED | OUTPATIENT
Start: 2022-11-22

## 2022-11-22 ASSESSMENT — ENCOUNTER SYMPTOMS
SHORTNESS OF BREATH: 0
EYES NEGATIVE: 1
COLOR CHANGE: 0
RESPIRATORY NEGATIVE: 1
ALLERGIC/IMMUNOLOGIC NEGATIVE: 1
ABDOMINAL PAIN: 0
GASTROINTESTINAL NEGATIVE: 1

## 2022-11-22 NOTE — PROGRESS NOTES
This patient was referred for tympanometric testing by Dr. Lavern Grayson due to ear fullness. Tympanometry revealed normal middle ear peak pressure and compliance, bilaterally. The results were reviewed with the patient. Recommendations for follow up will be made pending physician consult. Heather BOONE Audiology Intern.      Electronically signed by Haroon Pearce on 11/22/2022 at 11:46 AM

## 2022-11-22 NOTE — PROGRESS NOTES
Subjective:      Patient ID:  Karon Hall is a 69 y.o. female. HPI:  Pt returns for recheck of allergies. History of     When?  year:     Nasal Steroid: yes   Name: fluticasone (Flonase)   Still taking: yes   reason for stopping:     Other therapy:   Astelin- no  oral antihistamine- Zyrtec  leukotriene inhibitor- none  oral decongestant- none    which has been  not very effective     Pt has been on therapy for 1 month(s) and is doing marginally    Pt is still having ear pressure and pain and popping in the ears. She has been allergy tested in the past and had immunotherapy for 2 years long ago. The patient is complaining of nasal congestion and rhinorrhea    The patients worst time of year is fall and spring      Patient's medications, allergies, past medical, surgical, social and family histories were reviewed and updated as appropriate. Review of Systems   Constitutional: Negative. Negative for fever. HENT:  Positive for ear pain. Eyes: Negative. Negative for visual disturbance. Respiratory: Negative. Negative for shortness of breath. Cardiovascular: Negative. Negative for chest pain. Gastrointestinal: Negative. Negative for abdominal pain. Genitourinary: Negative. Musculoskeletal: Negative. Skin: Negative. Negative for color change. Allergic/Immunologic: Negative. Neurological: Negative. Hematological: Negative. Psychiatric/Behavioral: Negative. Negative for behavioral problems and hallucinations. All other systems reviewed and are negative. Objective:     Vitals:    11/22/22 1112   BP: (!) 151/78   Pulse: 57     Physical Exam  Vitals reviewed. Constitutional:       Appearance: Normal appearance. HENT:      Head: Normocephalic. Right Ear: Hearing, tympanic membrane, ear canal and external ear normal. No drainage or swelling. No middle ear effusion.       Left Ear: Hearing, tympanic membrane, ear canal and external ear normal. No drainage or swelling. No middle ear effusion. Ears:      Comments: No TMJ crepitus or tenderness      Nose: Nose normal.      Mouth/Throat:      Mouth: Mucous membranes are moist.   Eyes:      Conjunctiva/sclera: Conjunctivae normal.   Cardiovascular:      Rate and Rhythm: Normal rate. Pulses: Normal pulses. Pulmonary:      Effort: Pulmonary effort is normal.   Musculoskeletal:      Cervical back: Normal range of motion and neck supple. No rigidity or tenderness. Skin:     Capillary Refill: Capillary refill takes less than 2 seconds. Neurological:      Mental Status: She is alert and oriented to person, place, and time. Tymp:              Assessment:       Diagnosis Orders   1. Otalgia of both ears        2. Gastroesophageal reflux disease without esophagitis        3. Seasonal allergic rhinitis due to pollen                   Plan:      Allergic rhinitis  I do want to continue fluticasone (Flonase) Astelin for now. Call or return to clinic prn if these symptoms worsen or fail to improve as anticipated. TMJ  Discussed causes of TMJ and the management of it. Patient is to start a soft diet for the next week with warm compresses to the area and OTC NSAIDS.       Follow up in 2 month(s)

## 2022-11-28 ENCOUNTER — OFFICE VISIT (OUTPATIENT)
Dept: NEUROLOGY | Age: 67
End: 2022-11-28
Payer: MEDICARE

## 2022-11-28 VITALS
HEIGHT: 62 IN | HEART RATE: 58 BPM | BODY MASS INDEX: 25.21 KG/M2 | WEIGHT: 137 LBS | DIASTOLIC BLOOD PRESSURE: 74 MMHG | SYSTOLIC BLOOD PRESSURE: 130 MMHG | TEMPERATURE: 97.2 F | OXYGEN SATURATION: 95 %

## 2022-11-28 DIAGNOSIS — U09.9 COVID-19 LONG HAULER: Primary | ICD-10-CM

## 2022-11-28 PROCEDURE — 99203 OFFICE O/P NEW LOW 30 MIN: CPT | Performed by: NURSE PRACTITIONER

## 2022-11-28 PROCEDURE — 1123F ACP DISCUSS/DSCN MKR DOCD: CPT | Performed by: NURSE PRACTITIONER

## 2022-11-28 RX ORDER — OXCARBAZEPINE 150 MG/1
150 TABLET, FILM COATED ORAL 2 TIMES DAILY
Qty: 60 TABLET | Refills: 1 | Status: SHIPPED | OUTPATIENT
Start: 2022-11-28 | End: 2022-12-28

## 2022-11-28 NOTE — PROGRESS NOTES
[facility] APRN NP-C   [address], [facility] [place], [address] [address]  Phone: [phone]  Fax: [phone]       Marisela Raines is a 69 y.o. right handed female     Patient presents to neurology clinic today for evaluation and management of otalgia    Patient presents alone and is deemed a good historian. Past Medical History:     Past Medical History:   Diagnosis Date    Graves disease     Hypothyroidism        Past Surgical History:       Past Surgical History:   Procedure Laterality Date    NASAL SEPTUM SURGERY      WISDOM TOOTH EXTRACTION         Allergies:       Patient has no known allergies. Medications:     Prior to Admission medications    Medication Sig Start Date End Date Taking? Authorizing Provider   OXcarbazepine (TRILEPTAL) 150 MG tablet Take 1 tablet by mouth 2 times daily 11/28/22 12/28/22 Yes BOBBY Ortega NP   cetirizine (ZYRTEC) 10 MG tablet Take 10 mg by mouth daily   Yes Historical Provider, MD   azelastine (ASTELIN) 0.1 % nasal spray 1 spray by Nasal route 2 times daily Use in each nostril as directed 11/22/22  Yes Rosa Isela Mulligan DO   gabapentin (NEURONTIN) 100 MG capsule 300 mg 3 times daily.  9/27/22  Yes Historical Provider, MD   busPIRone (BUSPAR) 7.5 MG tablet Take 7.5 mg by mouth 3 times daily 9/21/22  Yes Historical Provider, MD   fluticasone (FLONASE) 50 MCG/ACT nasal spray 2 sprays by Nasal route daily 2 sprays in each nostril daily 10/21/22  Yes Rosa Isela Mulligan DO   methIMAzole (TAPAZOLE) 10 MG tablet Take 10 mg by mouth 3 times daily   Yes Historical Provider, MD   propranolol (INDERAL) 40 MG tablet Take 40 mg by mouth 3 times daily   Yes Historical Provider, MD   omeprazole (PRILOSEC OTC) 20 MG tablet Take 2 tablets by mouth daily 9/9/22  Yes Anne Marie Titus,    fluticasone (FLONASE) 50 MCG/ACT nasal spray 1 spray by Each Nostril route daily 6/19/22  Yes Susi Sauceda PA-C   escitalopram (LEXAPRO) 20 MG tablet TAKE ONE TABLET BY MOUTH EVERY DAY 5/27/22  Yes Historical Provider, MD    MG tablet TAKE ONE TABLET BY MOUTH THREE TIMES A DAY WITH FOOD AS NEEDED 6/8/22  Yes Historical Provider, MD   traZODone (DESYREL) 100 MG tablet TAKE ONE TABLET BY MOUTH EVERY DAY AT BEDTIME 6/5/22  Yes Historical Provider, MD   loratadine-pseudoephedrine (CLARITIN-D 12HR) 5-120 MG per extended release tablet Take 1 tablet by mouth 2 times daily  Patient not taking: Reported on 11/28/2022 6/19/22   Sagar Sauceda PA-C   loratadine (CLARITIN) 10 MG tablet  4/7/22   Historical Provider, MD   cyclobenzaprine (FLEXERIL) 10 MG tablet TAKE ONE TABLET BY MOUTH TWO TIMES A DAY  Patient not taking: Reported on 11/28/2022 6/8/22   Historical Provider, MD   predniSONE (DELTASONE) 10 MG tablet TAKE 6 TABLETS BY MOUTH DAILY ON DAY 1  THEN 5 TABS ON DAY 2  THEN 4 TABS ON DAY 3  THEN 3 TABS ON DAY 4  THEN 2 TABS ON DAY 5 AND 1 TABLET  Patient not taking: Reported on 11/28/2022 6/8/22   Historical Provider, MD       Social History:        reports that she has never smoked. She has never used smokeless tobacco. She reports that she does not currently use alcohol. She reports that she does not currently use drugs. Patient is employed as a  for [employer]. Patient has been  for 35 years and has 4 children--- 3 daughters and a son. Review of Systems:     (+) Ear pain-bilaterally  No chest pain or palpitations  No SOB  No vertigo, lightheadedness or loss of consciousness  No falls, tripping or stumbling  No incontinence of bowels or bladder  No itching or bruising appreciated  No numbness, tingling or focal arm/leg weakness    ROS is otherwise negative    Family History:     No family history on file. History of Present Illness:     Patient presents to neurology clinic today for evaluation of otalgia.   Patient states this all began back in August when she was diagnosed with COVID on the 13th.  Patient was started on Paxlovid. Patient says the following day her right ear began hurting. Later both ears began to hurt like she had a ear infection. Patient was evaluated at the urgent care and was told she had double infections and was started on amoxicillin. Patient said she eventually felt like she had 2 fires in her ears. Patient has tried over-the-counter's and was ultimately prescribed ibuprofen 800 mg. Patient was then started on prednisone followed by Augmentin after nothing helped. Patient has also been given Claritin, Claritin-D, cortisporin eardrops, Floxin eardrops, Ciprodex eardrops, and norco-- all providing no resolution to her ear pain. Patient also feels like she has congestion and is unable to free her ears of extra air. This has been ongoing since August.  Patient says now her pain is down to 2/10 when it is at [place] [address] however flareups occur when she is flying, driving in her car or any other activity. On a typical day her pain ranges from 2-6/10. Of note patient was started on gabapentin which she finds has helped some--now taking 300 mg twice daily as it was causing drowsiness when taken 3 times daily. Patient denies speech or swallowing difficulty, focal weakness, dizziness, headache or LOC. CT head was completed back in September which showed no acute intracranial pathology. Patient sleeps 6 to 7 hours nightly now that she is on trazodone. Patient eats 3 meals a day. Patient drinks 1 cup of coffee in the morning and 1---16 ounce diet soda during the day. Patient drinks about 24 ounces of water daily. Patient reports a severe stress level. Patient was exercising regularly prior to August however has not since August due to ear pain.     Objective:       /74   Pulse 58   Temp 97.2 °F (36.2 °C) (Temporal)   Ht 5' 2\" (1.575 m)   Wt 137 lb (62.1 kg)   SpO2 95%   BMI 25.06 kg/m²     General appearance: alert, appears stated age, cooperative and in no distress  Head: normocephalic, without obvious abnormality, atraumatic   No frontal, sinus, temporal or occipital pain  Eyes: conjunctivae/corneas clear; no drainage  Neck:  supple, symmetrical, trachea midline and thyroid not enlarged  Back: symmetric, no curvature.  ROM normal.  No trapezius muscle tenderness  Lungs: clear to auscultation bilaterally  Heart: regular rate and rhythm  Abdomen: soft, non-tender; bowel sounds normal  Extremities: normal, atraumatic, no cyanosis or edema  Pulses: 2+ and symmetric  Skin:  color, texture, turgor normal--no rashes or lesions      Mental Status: alert and oriented x 4, follows commands well, pleasant    Appropriate attention/concentration  Intact fundus of knowledge  Repetition intact  Memories intact    Speech: no dysarthria  Language: no aphasias---reading, writing, repetition, and object identification intact    Cranial Nerves:  I: smell    II: visual acuity     II: visual fields Full to confrontation   II: pupils PERRL   III,VII: ptosis None   III,IV,VI: extraocular muscles  EOMI without nystagmus   V: mastication Normal   V: facial light touch sensation  Normal   V,VII: corneal reflex     VII: facial muscle function - upper  Normal   VII: facial muscle function - lower Normal   VIII: hearing Mild [place]   IX: soft palate elevation  Normal   IX,X: gag reflex    XI: trapezius strength  5/5   XI: sternocleidomastoid strength 5/5   XI: neck extension strength  5/5   XII: tongue strength  Normal     Motor:  5/5 bilateral   5/5 throughout  Normal bulk and tone  No drift   No abnormal movements    Sensory:  LT and PP normal  Vibration normal    Coordination:   FN, FFM and CAMPOS normal  HS normal    Gait:  Normal    DTR:   Right Brachioradialis reflex 1+  Left Brachioradialis reflex 1+  Right Biceps reflex 1+  Left Biceps reflex 1+  Right Triceps reflex 1+  Left Triceps reflex 1+  Right Quadriceps reflex 1+  Left Quadriceps reflex 1+  Right Achilles reflex 1+  Left Achilles reflex 1+    No Babinskis  No Correa's    No other pathological reflexes    Laboratory/Radiology:  ry/Radiology:     CT head without contrast 9/3/2022:   No intracranial hemorrhage or acute intracranial disease. Noncontrast CT brain appears normal for age. All neuro images were personally reviewed at the time of this visit. No labs to review    Assessment:     Otalgia   This began after being diagnosed with COVID on August 13 and started on Paxlovid   Since then patient has seen 2 ENTs, urgent care physician and her PCP and has been prescribed multiple antibiotics and decongestions all to no avail. Patient was referred by ENT for atypical ear burning  Patient has found some relief with gabapentin 300 mg twice daily   Will obtain MRI brain with and without contrast to rule out intracranial pathology   Will start low-dose Trileptal    Plan:     MRI brain with and without contrast  BUN and creatinine prior to  Trileptal 150 mg twice daily  Call with any issues  Return to office after MRI brain completed        Giovanni Saint, APRN - NP-C  4:11 PM  11/28/2022    I spent 35 minutes with this patient obtaining the HPI and discussing the exam with greater than 50% of the time providing counseling and education on medications and other treatment plans. All questions were answered prior to leaving my office.

## 2022-12-13 ENCOUNTER — HOSPITAL ENCOUNTER (OUTPATIENT)
Dept: MRI IMAGING | Age: 67
Discharge: HOME | End: 2022-12-15
Payer: MEDICARE

## 2022-12-13 DIAGNOSIS — U09.9 COVID-19 LONG HAULER: ICD-10-CM

## 2022-12-13 PROCEDURE — 70553 MRI BRAIN STEM W/O & W/DYE: CPT

## 2022-12-13 PROCEDURE — A9579 GAD-BASE MR CONTRAST NOS,1ML: HCPCS | Performed by: RADIOLOGY

## 2022-12-13 PROCEDURE — 6360000004 HC RX CONTRAST MEDICATION: Performed by: RADIOLOGY

## 2022-12-13 RX ADMIN — GADOTERIDOL 12 ML: 279.3 INJECTION, SOLUTION INTRAVENOUS at 19:52

## 2023-01-09 ENCOUNTER — OFFICE VISIT (OUTPATIENT)
Dept: NEUROLOGY | Age: 68
End: 2023-01-09
Payer: MEDICARE

## 2023-01-09 ENCOUNTER — TELEPHONE (OUTPATIENT)
Dept: NEUROLOGY | Age: 68
End: 2023-01-09

## 2023-01-09 VITALS
HEIGHT: 62 IN | TEMPERATURE: 98 F | OXYGEN SATURATION: 95 % | WEIGHT: 136 LBS | SYSTOLIC BLOOD PRESSURE: 144 MMHG | DIASTOLIC BLOOD PRESSURE: 86 MMHG | HEART RATE: 75 BPM | BODY MASS INDEX: 25.03 KG/M2

## 2023-01-09 DIAGNOSIS — H92.03 OTALGIA OF BOTH EARS: ICD-10-CM

## 2023-01-09 DIAGNOSIS — U09.9 COVID-19 LONG HAULER: Primary | ICD-10-CM

## 2023-01-09 PROCEDURE — 1123F ACP DISCUSS/DSCN MKR DOCD: CPT | Performed by: NURSE PRACTITIONER

## 2023-01-09 PROCEDURE — 99214 OFFICE O/P EST MOD 30 MIN: CPT | Performed by: NURSE PRACTITIONER

## 2023-01-09 RX ORDER — ATORVASTATIN CALCIUM 40 MG/1
TABLET, FILM COATED ORAL
COMMUNITY
Start: 2022-12-16

## 2023-01-09 NOTE — PROGRESS NOTES
[address] [facility] APRN NP-C   [address], [address] Carmen Waller  Phone: [phone]  Fax: [phone]       Silas Childress is a 69 y.o. right handed female     Patient presents to neurology clinic today for evaluation and management of otalgia    Patient presents alone and is deemed a good historian. Patient states this all began back in August when she was diagnosed with COVID on the 13th. Patient was started on Paxlovid. Patient says the following day her right ear began hurting. Later both ears began to hurt like she had a ear infection. Patient was evaluated at the urgent care and was told she had double infections and was started on amoxicillin. Patient said she eventually felt like she had 2 fires in her ears. Patient has tried over-the-counter's and was ultimately prescribed ibuprofen 800 mg. Patient was then started on prednisone followed by Augmentin after nothing helped. Patient has also been given Claritin, Claritin-D, cortisporin eardrops, Floxin eardrops, Ciprodex eardrops, and norco-- all providing no resolution to her ear pain. Patient also feels like she has congestion and is unable to free her ears of extra air. This has been ongoing since August.  Patient says now her pain is down to 2/10 when it is at [place] [address] however flareups occur when she is flying, driving in her car or any other activity. On a typical day her pain ranges from 2-6/10. Of note patient was started on gabapentin which she finds has helped some--now taking 300 mg twice daily as it was causing drowsiness when taken 3 times daily. Patient denies speech or swallowing difficulty, focal weakness, dizziness, headache or LOC. CT head was completed back in September which showed no acute intracranial pathology. Last visit patient was started on Trileptal 150 mg twice daily. Today patient returns and does not feel Trileptal has added any additional relief.   Patient's pain continues to be severe with no decrease in pain since her last visit. Patient also has noticed her hair is falling out although she currently has an abnormal TSH as well when adverse reactions of Trileptal reviewed it is present and less than 1% of people. Patient has been evaluated by her PCP and an ENT at [facility] who now suspect patient may have TMJ. Patient is awaiting appointment with her dentist to get a dental guard.    (+) Ear pain-bilaterally  No chest pain or palpitations  No SOB  No vertigo, lightheadedness or loss of consciousness  No falls, tripping or stumbling  No incontinence of bowels or bladder  No itching or bruising appreciated  No numbness, tingling or focal arm/leg weakness    ROS is otherwise negative    Patient sleeps 6 to 7 hours nightly now that she is on trazodone. Patient eats 3 meals a day. Patient drinks 1 cup of coffee in the morning and 1---16 ounce diet soda during the day. Patient drinks about 24 ounces of water daily. Patient reports a severe stress level. Patient was exercising regularly prior to August however has not since August due to ear pain. She reports that she has never smoked. She has never used smokeless tobacco. She reports that she does not currently use alcohol. She reports that she does not currently use drugs. Patient is employed as a  for a [employer]. Patient has been  for 35 years and has 4 children--- 3 daughters and a son. Allergies:       Patient has no known allergies. Medications:     Prior to Admission medications    Medication Sig Start Date End Date Taking?  Authorizing Provider   atorvastatin (LIPITOR) 40 MG tablet TAKE ONE TABLET BY MOUTH EVERY DAY 12/16/22  Yes Historical Provider, MD   OXcarbazepine (TRILEPTAL) 150 MG tablet Take 1 tablet by mouth 2 times daily 11/28/22 1/9/23 Yes BOBBY Ramirez - NP   azelastine (ASTELIN) 0.1 % nasal spray 1 spray by Nasal route 2 times daily Use in each nostril as directed 11/22/22  Yes Gm Mulligan, DO   gabapentin (NEURONTIN) 100 MG capsule 300 mg 2 times daily. 9/27/22  Yes Historical Provider, MD   methIMAzole (TAPAZOLE) 10 MG tablet Take 5 mg by mouth daily   Yes Historical Provider, MD   propranolol (INDERAL) 40 MG tablet Take 40 mg by mouth 3 times daily   Yes Historical Provider, MD   omeprazole (PRILOSEC OTC) 20 MG tablet Take 2 tablets by mouth daily 9/9/22  Yes Anne Marie Titus, DO   escitalopram (LEXAPRO) 20 MG tablet TAKE ONE TABLET BY MOUTH EVERY DAY 5/27/22  Yes Historical Provider, MD   traZODone (DESYREL) 100 MG tablet TAKE ONE TABLET BY MOUTH EVERY DAY AT BEDTIME 6/5/22  Yes Historical Provider, MD    MG tablet TAKE ONE TABLET BY MOUTH THREE TIMES A DAY WITH FOOD AS NEEDED 6/8/22   Historical Provider, MD     Objective:       BP (!) 144/86 (Site: Right Upper Arm)   Pulse 75   Temp 98 °F (36.7 °C)   Ht 5' 2\" (1.575 m)   Wt 136 lb (61.7 kg)   SpO2 95%   BMI 24.87 kg/m²     General appearance: alert, appears stated age, cooperative and in no distress  Head: normocephalic, without obvious abnormality, atraumatic   No frontal, sinus, temporal or occipital pain  Eyes: conjunctivae/corneas clear; no drainage  Neck:  supple, symmetrical, trachea midline and thyroid not enlarged  Back: symmetric, no curvature. ROM normal.  No trapezius muscle tenderness  Lungs: clear to auscultation bilaterally  Heart: regular rate and rhythm  Abdomen: soft, non-tender; bowel sounds normal  Extremities: normal, atraumatic, no cyanosis or edema  Pulses: 2+ and symmetric  Skin:  color, texture, turgor normal--no rashes or lesions      Mental Status: alert and oriented x 4, follows commands well, pleasant    Appropriate attention/concentration  Intact fundus of knowledge  Repetition intact  Memories intact    Speech: no dysarthria  Language: no aphasias---reading, writing, repetition, and object identification intact    Cranial Nerves:  I: smell    II:  visual acuity     II: visual fields Full to confrontation   II: pupils PERRL   III,VII: ptosis None   III,IV,VI: extraocular muscles  EOMI without nystagmus   V: mastication Normal   V: facial light touch sensation  Normal   V,VII: corneal reflex     VII: facial muscle function - upper  Normal   VII: facial muscle function - lower Normal   VIII: hearing Mild [place]   IX: soft palate elevation  Normal   IX,X: gag reflex    XI: trapezius strength  5/5   XI: sternocleidomastoid strength 5/5   XI: neck extension strength  5/5   XII: tongue strength  Normal     Motor:  5/5 bilateral   5/5 throughout  Normal bulk and tone  No drift   No abnormal movements    Sensory:  LT and PP normal  Vibration normal    Coordination:   FN, FFM and CAMPOS normal  HS normal    Gait:  Normal    DTR:   Right Brachioradialis reflex 1+  Left Brachioradialis reflex 1+  Right Biceps reflex 1+  Left Biceps reflex 1+  Right Triceps reflex 1+  Left Triceps reflex 1+  Right Quadriceps reflex 1+  Left Quadriceps reflex 1+  Right Achilles reflex 1+  Left Achilles reflex 1+    No Babinskis  No Correa's    No other pathological reflexes    Laboratory/Radiology:  ry/Radiology:     CT head without contrast 9/3/2022:   No intracranial hemorrhage or acute intracranial disease. Noncontrast CT brain appears normal for age. MRI brain with and without contrast 12/14/2022:  1. No acute intracranial abnormality. 2.  Mild cerebral volume loss and mild chronic microvascular ischemic changes are in the range that are unremarkable for age. All neuro images were personally reviewed at the time of this visit. No labs to review    Assessment:     Otalgia   This began after being diagnosed with COVID on August 13 and started on Paxlovid   Since then patient has seen 2 ENTs, urgent care physician and her PCP and has been prescribed multiple antibiotics and decongestions all to no avail.      Patient was referred here here by ENT for atypical ear burning  Patient has found some relief with gabapentin 300 mg twice daily   MRI brain with and without contrast ruled out intracranial pathology   Low-dose Trileptal has not added any relief. Patient is also started to experience hair loss. Will wean patient to discontinue over the next few days    Suspected TMJ   Awaiting dental evaluation for dental guard   Started on muscle relaxant by her PCP--patient to call with name as she only has 20 tabs    Vitamin D deficiency   Continue vitamin D supplementation    Plan:     Patient to call to let me know what muscle relaxant she is on for suspected TMJ  Trileptal 150 mg daily for 4 days then 75 mg for 4 days then stop  Call with any issues  Return to office in 3 months        BOBBY Marie - NP-C  11:00 AM  1/9/2023    I spent 32 minutes with this patient obtaining the HPI and discussing the exam with greater than 50% of the time providing counseling and education on medications and other treatment plans. All questions were answered prior to leaving my office.

## 2023-01-27 RX ORDER — OXCARBAZEPINE 150 MG/1
150 TABLET, FILM COATED ORAL 2 TIMES DAILY
Qty: 60 TABLET | Refills: 1 | Status: SHIPPED | OUTPATIENT
Start: 2023-01-27 | End: 2023-02-26